# Patient Record
Sex: MALE | HISPANIC OR LATINO | Employment: FULL TIME | URBAN - METROPOLITAN AREA
[De-identification: names, ages, dates, MRNs, and addresses within clinical notes are randomized per-mention and may not be internally consistent; named-entity substitution may affect disease eponyms.]

---

## 2017-04-07 ENCOUNTER — OFFICE VISIT (OUTPATIENT)
Dept: FAMILY MEDICINE | Facility: CLINIC | Age: 47
End: 2017-04-07
Payer: OTHER MISCELLANEOUS

## 2017-04-07 VITALS
RESPIRATION RATE: 15 BRPM | TEMPERATURE: 97.7 F | WEIGHT: 170 LBS | OXYGEN SATURATION: 98 % | HEART RATE: 71 BPM | SYSTOLIC BLOOD PRESSURE: 132 MMHG | DIASTOLIC BLOOD PRESSURE: 76 MMHG

## 2017-04-07 DIAGNOSIS — M62.830 SPASM OF BACK MUSCLES: Primary | ICD-10-CM

## 2017-04-07 PROCEDURE — 99203 OFFICE O/P NEW LOW 30 MIN: CPT | Performed by: PHYSICIAN ASSISTANT

## 2017-04-07 RX ORDER — ATORVASTATIN CALCIUM 20 MG/1
20 TABLET, FILM COATED ORAL
COMMUNITY
Start: 2016-10-28

## 2017-04-07 RX ORDER — LISINOPRIL 10 MG/1
10 TABLET ORAL
COMMUNITY
Start: 2016-10-28

## 2017-04-07 RX ORDER — METHYLPREDNISOLONE 4 MG
TABLET, DOSE PACK ORAL
Qty: 21 TABLET | Refills: 0 | Status: SHIPPED | OUTPATIENT
Start: 2017-04-07

## 2017-04-07 RX ORDER — CYCLOBENZAPRINE HCL 10 MG
5-10 TABLET ORAL 3 TIMES DAILY PRN
Qty: 30 TABLET | Refills: 1 | Status: SHIPPED | OUTPATIENT
Start: 2017-04-07

## 2017-04-07 NOTE — PROGRESS NOTES
SUBJECTIVE:                                                    Gilbert Dempsey is a 47 year old male who presents to clinic today for the following health issues:    Back Pain      Duration: 4/2/17        Specific cause: work-related    Description:   Location of pain: low back right  Character of pain: sharp  Pain radiation:none  New numbness or weakness in legs, not attributed to pain:  no     Intensity: mild, moderate    History:   Pain interferes with job: YES  History of back problems: no prior back problems  Any previous MRI or X-rays: None  Sees a specialist for back pain:  No  Therapies tried without relief: icy hot    Alleviating factors:   Improved by: icy hot-somewhat helpful, wears a belt at work      Precipitating factors:  Worsened by: Lifting and Bending    Functional and Psychosocial Screen (Antony STarT Back):      Not performed today   Accompanying Signs & Symptoms:  Risk of Fracture:  None  Risk of Cauda Equina:  None  Risk of Infection:  None  Risk of Cancer:  None  Risk of Ankylosing Spondylitis:  Onset at age <35, male, AND morning back stiffness. no                  Reviewed and updated as needed this visit by clinical staff  Tobacco  Allergies  Meds  Problems  Med Hx  Surg Hx  Fam Hx  Soc Hx        Reviewed and updated as needed this visit by Provider  Tobacco  Allergies  Meds  Problems  Med Hx  Surg Hx  Fam Hx  Soc Hx        Additional complaints: None    HPI additional notes: Gilbert presents today with   Chief Complaint   Patient presents with     Back Pain   Pain when lifting on Monday.  Now he still has some pain whenever he has to lift something and the pain travels up his back.  Has not been working with restrictions since the injury but still has pain when lifting over 50 lbs.       ROS:  C: Negative for fever, chills, recent change in weight  Skin: Negative for worrisome rashes or lesions  Resp: Negative for significant cough or SOB  CV: Negative for chest pain or  peripheral edema  GI: Negative for nausea, abdominal pain, heartburn, or change in bowel habits  MS: Positive for lower back pain  P: Negative for changes in mood or affect  ROS otherwise negative.    Chart Review:  Problem list, Medication list, Allergies, Medical/Social/Surg hx reviewed in [facility], updated as appropriate.   OBJECTIVE:                                                    /76 (BP Location: Right arm, Patient Position: Chair, Cuff Size: Adult Regular)  Pulse 71  Temp 97.7  F (36.5  C) (Tympanic)  Resp 15  Wt 170 lb (77.1 kg)  SpO2 98%  There is no height or weight on file to calculate BMI.  GENERAL: healthy, alert, in no acute distress  HENT: Mucous mebranes moist.  ORTHO: Lumber/Thoracic Spine Exam: Tender:  right parathoracic muscles, right para lumbar muscles  Non-tender:  thoracic spinous processes, thoracic facet joints, lumbar spinous processes, lumbar facet joints, right SI joint, right sciatic notch  Range of Motion:  lumbar flexion  full, lumbar extension  full, left lateral lumbar bending  decreased, painful, right lateral lumbar bending  full, left lateral lumbar rotation  full, painful, right lateral lumbar rotation  full, painful  Strength:  able to heel walk, able to toe walk  Special tests:  negative straight leg raises  SKIN: no suspicious lesions, no rashes  PSYCH: Alert and oriented times 3;  Able to articulate logical thoughts. Affect is normal.    Diagnostic test results: none      ASSESSMENT/PLAN:                                                          ICD-10-CM    1. Spasm of back muscles M62.830 cyclobenzaprine (FLEXERIL) 10 MG tablet     methylPREDNISolone (MEDROL DOSEPAK) 4 MG tablet     ROXANE PT, HAND, AND CHIROPRACTIC REFERRAL     - Take ibuprofen (Advil, Motrin) 600 mg. three times a day or Aleve (naproxen sodium) 220 to 440 mg. two times a day as needed for pain.  Do not take with medrol dose pack if that was started today.  - Use hot or cold packs (which ever gives  more relief) for 15 minutes at a time at least three times daily.  - Stretch and massage the muscles affected.  - Muscle relaxants at night.  Can take every 8 hours if you are not too drowsy when taking or take a 1/2 of a tab during the day and a whole tab at night.  - If no improvement in 1 week, call and I will put in an order for PT.    Please see patient instructions for treatment details.    Follow up as needed.    Nadya Hines PA-C  [facility]

## 2017-04-07 NOTE — PATIENT INSTRUCTIONS
Self-Care for Back Pain    Principles to encourage healing of musculoskeletal back pain:  1) Reduce Strain  2) Practice Relaxation  3) Good Posture  4) Progressive Stretching  5) Get a good night's sleep    Avoid muscle tensing habits and activities that put strain on the back.  Remind yourself regularly to determine if you are doing any tensing habits. Use reminders methods such as stickers or timers.  If noticed, replace these negative habits with a positive habit of having your head up, chest up, chin in, and shoulders down and back.       Back tensing    Sleeping on your stomach    Tensing or twist the back often    Sitting for hours at a time most days    Lifting objects by bending at the waist   Slouching while standing or walking    Slouching forward while sitting in a chair    Carrying heavy objects while bent at waist     Twisting the back to one side when working     Avoid tensing and poor posture while driving     Avoid events or activities that trigger the pain.  Use a pain diary to review daily activities that aggravate the pain and change your behavior.    Practice relaxation and deep breathing  Lay down on your back and take a deep breath from your abdomen up. Let it out slowly as you allow your back, neck and shoulders to relax.  This can calm you, reduce stress, and decrease muscle tensing.     Good Posture  Sitting:  Keep your butt to the back of the chair and sit with your shoulders and head up. This will help reduce strain to the back muscles.  Closely monitor your back position (under your shoulders) to maintain balanced and relaxed head, neck, shoulder and back muscles.  Support your mid back with a rolled towel and lean back so that your the shoulders fall back, your neck is relaxed, and your head is up.    Lifting:  Use the squat lift for heavy objects. Start with the object between your legs.  Squat down, keep your head up, shoulders back, and spine erect.  Bend at the hips, knees, and  ankles as you lift.   Use the golfer's lift for light objects by using some support (a chair, a desk, or a putter).  Put your hand on the support to take the load off your back as you bend over.      Progressive Stretching.  Stretch the back and hip muscles and joints.  One option would be to use sun salutation yoga exercises.   Touch the toes, reach above, one leg out, other leg, butt up, butt down, other leg up, and stretch.  This routine will stretch tight paraspinous, hamstrings, hip, and quadratus lumborum muscles.  Gently stretch 4 rotations for 2 times per day to loosen up tight backs.     Get a good night s sleep with a good semi-firm mattress.  Avoid caffeinated beverages (coffee, tea, and soft drinks) later in the day.  Improve your sleep environment.  Reduce light and noise and lie on a comfortable mattress.  Reduce stimulating activities in the late evening including computer work and exercising.  Avoid sleeping on your stomach.     Apply heat or cold and massage tender muscles.   Heat or ice applications used up to four times per day can relax the muscles and reduce pain. For heat, microwave a wet towel for approximately 1 minute or until towel is warm and wrap around a hot-water bottle or heated gel pack and apply for 15 to 20 minutes. For cold, use ice wrapped in a thin cloth on the area until you first feel some numbness. Use what feels best. Heat is often used for more chronic pain conditions and cold for acute conditions. Massage tender points in the muscles    Use anti-inflammatory and pain-reducing medications.  Short-term and occasional use of over-the-counter ibuprofen, naproxen, acetaminophen, or aspirin (without caffeine) can reduce joint and muscle pain.  Be sure to check with your provider to be sure this would be safe.  Please note that extended daily use can sometimes cause rebound pain and an extend your symptoms.

## 2017-04-07 NOTE — MR AVS SNAPSHOT
After Visit Summary   4/7/2017    Gilbert Dempsey    MRN: [id number]           Patient Information     Date Of Birth          1970        Visit Information        Provider Department      4/7/2017 8:10 AM Nadya Hines PA-C [facility]        Today's Diagnoses     Spasm of back muscles    -  1      Care Instructions      Self-Care for Back Pain    Principles to encourage healing of musculoskeletal back pain:  1) Reduce Strain  2) Practice Relaxation  3) Good Posture  4) Progressive Stretching  5) Get a good night's sleep    Avoid muscle tensing habits and activities that put strain on the back.  Remind yourself regularly to determine if you are doing any tensing habits. Use reminders methods such as stickers or timers.  If noticed, replace these negative habits with a positive habit of having your head up, chest up, chin in, and shoulders down and back.       Back tensing    Sleeping on your stomach    Tensing or twist the back often    Sitting for hours at a time most days    Lifting objects by bending at the waist   Slouching while standing or walking    Slouching forward while sitting in a chair    Carrying heavy objects while bent at waist     Twisting the back to one side when working     Avoid tensing and poor posture while driving     Avoid events or activities that trigger the pain.  Use a pain diary to review daily activities that aggravate the pain and change your behavior.    Practice relaxation and deep breathing  Lay down on your back and take a deep breath from your abdomen up. Let it out slowly as you allow your back, neck and shoulders to relax.  This can calm you, reduce stress, and decrease muscle tensing.     Good Posture  Sitting:  Keep your butt to the back of the chair and sit with your shoulders and head up. This will help reduce strain to the back muscles.  Closely monitor your back position (under your shoulders) to maintain  balanced and relaxed head, neck, shoulder and back muscles.  Support your mid back with a rolled towel and lean back so that your the shoulders fall back, your neck is relaxed, and your head is up.    Lifting:  Use the squat lift for heavy objects. Start with the object between your legs.  Squat down, keep your head up, shoulders back, and spine erect.  Bend at the hips, knees, and ankles as you lift.   Use the golfer's lift for light objects by using some support (a chair, a desk, or a putter).  Put your hand on the support to take the load off your back as you bend over.      Progressive Stretching.  Stretch the back and hip muscles and joints.  One option would be to use sun salutation yoga exercises.   Touch the toes, reach above, one leg out, other leg, butt up, butt down, other leg up, and stretch.  This routine will stretch tight paraspinous, hamstrings, hip, and quadratus lumborum muscles.  Gently stretch 4 rotations for 2 times per day to loosen up tight backs.     Get a good night s sleep with a good semi-firm mattress.  Avoid caffeinated beverages (coffee, tea, and soft drinks) later in the day.  Improve your sleep environment.  Reduce light and noise and lie on a comfortable mattress.  Reduce stimulating activities in the late evening including computer work and exercising.  Avoid sleeping on your stomach.     Apply heat or cold and massage tender muscles.   Heat or ice applications used up to four times per day can relax the muscles and reduce pain. For heat, microwave a wet towel for approximately 1 minute or until towel is warm and wrap around a hot-water bottle or heated gel pack and apply for 15 to 20 minutes. For cold, use ice wrapped in a thin cloth on the area until you first feel some numbness. Use what feels best. Heat is often used for more chronic pain conditions and cold for acute conditions. Massage tender points in the muscles    Use anti-inflammatory and pain-reducing medications.   Short-term and occasional use of over-the-counter ibuprofen, naproxen, acetaminophen, or aspirin (without caffeine) can reduce joint and muscle pain.  Be sure to check with your provider to be sure this would be safe.  Please note that extended daily use can sometimes cause rebound pain and an extend your symptoms.            Follow-ups after your visit        Additional Services     ROXANE PT, HAND, AND CHIROPRACTIC REFERRAL       Your provider has referred you to: Massage Therapy    Indication/Reason for Referral: Right lower/mid back strain  Onset of Illness: 1 week  Therapy Orders: Evaluate and Treat  Special Programs: None  Special Request: None    Antony Hardy      Additional Comments for the Therapist or Chiropractor: None    Please be aware that coverage of these services is subject to the terms and limitations of your health insurance plan.  Call member services at your health plan with any benefit or coverage questions.      Please bring the following to your appointment:    *Your personal calendar for scheduling future appointments  *Comfortable clothing                  Who to contact     If you have questions or need follow up information about today's clinic visit or your schedule please contact [facility] directly at [phone].  Normal or non-critical lab and imaging results will be communicated to you by "[employer]hart, letter or phone within 4 business days after the clinic has received the results. If you do not hear from us within 7 days, please contact the clinic through "[employer]hart or phone. If you have a critical or abnormal lab result, we will notify you by phone as soon as possible.  Submit refill requests through [email or web address] or call your pharmacy and they will forward the refill request to us. Please allow 3 business days for your refill to be completed.          Additional Information About Your Visit        [email or web address] Information     [email or web address] lets you send messages to your  "doctor, view your test results, renew your prescriptions, schedule appointments and more. To sign up, go to [email or web address] . Click on \"Log in\" on the left side of the screen, which will take you to the Welcome page. Then click on \"Sign up Now\" on the right side of the page.     You will be asked to enter the access code listed below, as well as some personal information. Please follow the directions to create your username and password.     Your access code is: [email or web address]  Expires: 2017  8:41 AM     Your access code will  in 90 days. If you need help or a new code, please call your [place] clinic or [phone].        Care EveryWhere ID     This is your Care EveryWhere ID. This could be used by other organizations to access your [place] medical records  [id number]        Your Vitals Were     Pulse Temperature Respirations Pulse Oximetry          71 97.7  F (36.5  C) (Tympanic) 15 98%         Blood Pressure from Last 3 Encounters:   17 132/76    Weight from Last 3 Encounters:   17 170 lb (77.1 kg)              We Performed the Following     ROXANE PT, HAND, AND CHIROPRACTIC REFERRAL          Today's Medication Changes          These changes are accurate as of: 17  9:47 AM.  If you have any questions, ask your nurse or doctor.               Start taking these medicines.        Dose/Directions    cyclobenzaprine 10 MG tablet   Commonly known as:  FLEXERIL   Used for:  Spasm of back muscles   Started by:  Nadya Hines PA-C        Dose:  5-10 mg   Take 0.5-1 tablets (5-10 mg) by mouth 3 times daily as needed for muscle spasms   Quantity:  30 tablet   Refills:  1       methylPREDNISolone 4 MG tablet   Commonly known as:  MEDROL DOSEPAK   Used for:  Spasm of back muscles   Started by:  Nadya Hines PA-C        Follow package instructions   Quantity:  21 tablet   Refills:  0         Stop taking these medicines if you haven't already. Please contact " your care team if you have questions.     UNKNOWN TO PATIENT   Stopped by:  Nadya Hines PA-C                Where to get your medicines      Some of these will need a paper prescription and others can be bought over the counter.  Ask your nurse if you have questions.     Bring a paper prescription for each of these medications     cyclobenzaprine 10 MG tablet    methylPREDNISolone 4 MG tablet                Primary Care Provider    None Specified       No primary provider on file.        Thank you!     Thank you for choosing [facility]  for your care. Our goal is always to provide you with excellent care. Hearing back from our patients is one way we can continue to improve our services. Please take a few minutes to complete the written survey that you may receive in the mail after your visit with us. Thank you!             Your Updated Medication List - Protect others around you: Learn how to safely use, store and throw away your medicines at www.disposemymeds.org.          This list is accurate as of: 4/7/17  9:47 AM.  Always use your most recent med list.                   Brand Name Dispense Instructions for use    atorvastatin 20 MG tablet    LIPITOR     Take 20 mg by mouth       cyclobenzaprine 10 MG tablet    FLEXERIL    30 tablet    Take 0.5-1 tablets (5-10 mg) by mouth 3 times daily as needed for muscle spasms       lisinopril 10 MG tablet    PRINIVIL/ZESTRIL     Take 10 mg by mouth       methylPREDNISolone 4 MG tablet    MEDROL DOSEPAK    21 tablet    Follow package instructions       VITAMIN D (CHOLECALCIFEROL) PO      Take by mouth daily

## 2017-04-07 NOTE — LETTER
[facility]  [address] [place] [address] [address]  [facility] [address]  [phone]    REPORT OF WORK ABILITY    NOTE TO EMPLOYEE: You must promptly provide a copy of this report to your  employer or worker's compensation insurer, and Qualified Rehabilitation Consultant.    Date: 4/7/2017                     Employee Name: Gilbert Dempsey         YOB: 1970  Medical Record Number: [id number]   Soc.Sec.No: [id number]  Employer:                  Date of Injury: 4/2/17  Managed Care Organization / Insurance Company Name: UNKNOWN    Diagnosis: Right Back Muscle Strain  Work Related: yes    Permanent Partial Disability(PPD) likely: UNKNOWN    EMPLOYEE IS ABLE TO WORK: Return to work without restrictions on 4/9/17     OTHER RESTRICTIONS: None    TREATMENT PLAN/NOTES: change work position for comfort.      Kate Hines PA-C    Family Medicine  [facility]  [address] [place] [address], [facility] [address]  [place] [address]  [phone] (p)  [phone] (f)

## 2017-04-07 NOTE — NURSING NOTE
Chief Complaint   Patient presents with     Back Pain       Initial /76 (BP Location: Right arm, Patient Position: Chair, Cuff Size: Adult Regular)  Pulse 71  Temp 97.7  F (36.5  C) (Tympanic)  Resp 15  Wt 170 lb (77.1 kg)  SpO2 98% There is no height or weight on file to calculate BMI.  Medication Reconciliation: complete

## 2024-04-16 ENCOUNTER — HOSPITAL ENCOUNTER (EMERGENCY)
Facility: CLINIC | Age: 54
Discharge: HOME | End: 2024-04-17
Payer: COMMERCIAL

## 2024-04-16 VITALS
SYSTOLIC BLOOD PRESSURE: 130 MMHG | TEMPERATURE: 97.9 F | OXYGEN SATURATION: 98 % | DIASTOLIC BLOOD PRESSURE: 86 MMHG | HEART RATE: 79 BPM | RESPIRATION RATE: 18 BRPM

## 2024-04-16 ASSESSMENT — COLUMBIA-SUICIDE SEVERITY RATING SCALE - C-SSRS
1. IN THE PAST MONTH, HAVE YOU WISHED YOU WERE DEAD OR WISHED YOU COULD GO TO SLEEP AND NOT WAKE UP?: NO
2. HAVE YOU ACTUALLY HAD ANY THOUGHTS OF KILLING YOURSELF IN THE PAST MONTH?: NO
6. HAVE YOU EVER DONE ANYTHING, STARTED TO DO ANYTHING, OR PREPARED TO DO ANYTHING TO END YOUR LIFE?: NO

## 2024-04-16 ASSESSMENT — ACTIVITIES OF DAILY LIVING (ADL)
ADLS_ACUITY_SCORE: 35

## 2024-04-17 ASSESSMENT — ACTIVITIES OF DAILY LIVING (ADL): ADLS_ACUITY_SCORE: 35

## 2024-04-17 NOTE — ED TRIAGE NOTES
Pt used Lynx hair dye last evening and then woke at 2am with itching and swelling to the scalp. Took loratadine today and it is still not improving.      Triage Assessment (Adult)       Row Name 04/16/24 3081          Triage Assessment    Airway WDL WDL        Respiratory WDL    Respiratory WDL WDL        Skin Circulation/Temperature WDL    Skin Circulation/Temperature WDL X  redness to scalp        Cardiac WDL    Cardiac WDL WDL        Peripheral/Neurovascular WDL    Peripheral Neurovascular WDL WDL        Cognitive/Neuro/Behavioral WDL    Cognitive/Neuro/Behavioral WDL WDL

## 2024-11-10 ENCOUNTER — OFFICE VISIT (OUTPATIENT)
Dept: URGENT CARE | Facility: URGENT CARE | Age: 54
End: 2024-11-10
Payer: COMMERCIAL

## 2024-11-10 VITALS
HEART RATE: 83 BPM | DIASTOLIC BLOOD PRESSURE: 82 MMHG | TEMPERATURE: 98.8 F | WEIGHT: 173.6 LBS | SYSTOLIC BLOOD PRESSURE: 137 MMHG | RESPIRATION RATE: 18 BRPM | OXYGEN SATURATION: 97 %

## 2024-11-10 DIAGNOSIS — J06.9 VIRAL UPPER RESPIRATORY TRACT INFECTION WITH COUGH: Primary | ICD-10-CM

## 2024-11-10 LAB — SARS-COV-2 RNA RESP QL NAA+PROBE: NEGATIVE

## 2024-11-10 PROCEDURE — 87635 SARS-COV-2 COVID-19 AMP PRB: CPT | Performed by: PHYSICIAN ASSISTANT

## 2024-11-10 PROCEDURE — 99203 OFFICE O/P NEW LOW 30 MIN: CPT | Performed by: PHYSICIAN ASSISTANT

## 2024-11-10 RX ORDER — BENZONATATE 100 MG/1
100 CAPSULE ORAL 3 TIMES DAILY PRN
Qty: 30 CAPSULE | Refills: 0 | Status: SHIPPED | OUTPATIENT
Start: 2024-11-10 | End: 2024-11-17

## 2024-11-10 NOTE — PROGRESS NOTES
URGENT CARE VISIT:    SUBJECTIVE:   Gilbert Dempsey is a 54 year old male presenting with a chief complaint of cough - productive.  Onset was 3 day(s) ago.   He denies the following symptoms: fever and shortness of breath  Course of illness is same.    Treatment measures tried include None tried with no relief of symptoms.  Predisposing factors include None.    PMH: No past medical history on file.  Allergies: Patient has no known allergies.   Medications:   Current Outpatient Medications   Medication Sig Dispense Refill    atorvastatin (LIPITOR) 20 MG tablet Take 20 mg by mouth      benzonatate (TESSALON) 100 MG capsule Take 1 capsule (100 mg) by mouth 3 times daily as needed. 30 capsule 0    lisinopril (PRINIVIL/ZESTRIL) 10 MG tablet Take 10 mg by mouth      sodium chloride (OCEAN) 0.65 % nasal spray 1 to 2 sprays in nose daily as needed 15 mL 0    VITAMIN D, CHOLECALCIFEROL, PO Take by mouth daily      cyclobenzaprine (FLEXERIL) 10 MG tablet Take 0.5-1 tablets (5-10 mg) by mouth 3 times daily as needed for muscle spasms (Patient not taking: Reported on 11/10/2024) 30 tablet 1    methylPREDNISolone (MEDROL DOSEPAK) 4 MG tablet Follow package instructions (Patient not taking: Reported on 11/10/2024) 21 tablet 0     Social History:   Social History     Tobacco Use    Smoking status: Former    Smokeless tobacco: Not on file   Substance Use Topics    Alcohol use: Yes     Comment: minimal       ROS:  Review of systems negative except as stated above.    OBJECTIVE:  /82   Pulse 83   Temp 98.8  F (37.1  C)   Resp 18   Wt 78.7 kg (173 lb 9.6 oz)   SpO2 97%   GENERAL APPEARANCE: healthy, alert and no distress  EYES: EOMI,  PERRL, conjunctiva clear  HENT: ear canals and TM's normal.  Nose and mouth without ulcers, erythema or lesions  NECK: supple, nontender, no lymphadenopathy  RESP: lungs clear to auscultation - no rales, rhonchi or wheezes  CV: regular rates and rhythm, normal S1 S2, no murmur noted  SKIN: no  suspicious lesions or rashes      ASSESSMENT:    ICD-10-CM    1. Viral upper respiratory tract infection with cough  J06.9 Symptomatic COVID-19 Virus (Coronavirus) by PCR Nasopharyngeal     sodium chloride (OCEAN) 0.65 % nasal spray     benzonatate (TESSALON) 100 MG capsule          PLAN:  Patient Instructions   Patient was educated on the natural course of condition. Take medications as directed. Side effects discussed. Conservative measures discussed including rest, increased fluids, humidifier, and teaspoon of honey. See your primary care provider if symptoms do not improve in 7 days. Seek emergency care if you develop shortness of breath or fever over 103.    Patient verbalized understanding and is agreeable to plan. The patient was discharged ambulatory and in stable condition.    Leonora Schmid PA-C ....................  11/10/2024   2:18 PM

## 2024-11-10 NOTE — PATIENT INSTRUCTIONS
Patient was educated on the natural course of condition. Take medications as directed. Side effects discussed. Conservative measures discussed including rest, increased fluids, humidifier, and teaspoon of honey. See your primary care provider if symptoms do not improve in 7 days. Seek emergency care if you develop shortness of breath or fever over 103.